# Patient Record
Sex: FEMALE | Race: WHITE
[De-identification: names, ages, dates, MRNs, and addresses within clinical notes are randomized per-mention and may not be internally consistent; named-entity substitution may affect disease eponyms.]

---

## 2018-07-31 ENCOUNTER — HOSPITAL ENCOUNTER (OUTPATIENT)
Dept: HOSPITAL 96 - M.LAB | Age: 68
End: 2018-07-31
Attending: ANESTHESIOLOGY
Payer: COMMERCIAL

## 2018-07-31 DIAGNOSIS — E03.9: ICD-10-CM

## 2018-07-31 DIAGNOSIS — Z01.812: Primary | ICD-10-CM

## 2019-06-10 ENCOUNTER — HOSPITAL ENCOUNTER (EMERGENCY)
Dept: HOSPITAL 96 - M.ERS | Age: 69
Discharge: HOME | End: 2019-06-10
Payer: COMMERCIAL

## 2019-06-10 VITALS — BODY MASS INDEX: 28.32 KG/M2 | HEIGHT: 65 IN | WEIGHT: 170 LBS

## 2019-06-10 VITALS — DIASTOLIC BLOOD PRESSURE: 84 MMHG | SYSTOLIC BLOOD PRESSURE: 142 MMHG

## 2019-06-10 DIAGNOSIS — H83.02: Primary | ICD-10-CM

## 2019-06-10 DIAGNOSIS — E03.9: ICD-10-CM

## 2019-06-10 LAB
ABSOLUTE BASOPHILS: 0 THOU/UL (ref 0–0.2)
ABSOLUTE EOSINOPHILS: 0.4 THOU/UL (ref 0–0.7)
ABSOLUTE MONOCYTES: 0.6 THOU/UL (ref 0–1.2)
ALBUMIN SERPL-MCNC: 3.9 G/DL (ref 3.4–5)
ALP SERPL-CCNC: 71 U/L (ref 46–116)
ALT SERPL-CCNC: 24 U/L (ref 30–65)
ANION GAP SERPL CALC-SCNC: 7 MMOL/L (ref 7–16)
APTT BLD: 25.6 SECONDS (ref 25–31.3)
AST SERPL-CCNC: 15 U/L (ref 15–37)
BASOPHILS NFR BLD AUTO: 0.7 %
BILIRUB SERPL-MCNC: 0.6 MG/DL
BUN SERPL-MCNC: 17 MG/DL (ref 7–18)
CALCIUM SERPL-MCNC: 9 MG/DL (ref 8.5–10.1)
CHLORIDE SERPL-SCNC: 102 MMOL/L (ref 98–107)
CO2 SERPL-SCNC: 30 MMOL/L (ref 21–32)
CREAT SERPL-MCNC: 1.1 MG/DL (ref 0.6–1.3)
EOSINOPHIL NFR BLD: 6.1 %
GLUCOSE SERPL-MCNC: 119 MG/DL (ref 70–99)
GRANULOCYTES NFR BLD MANUAL: 57.1 %
HCT VFR BLD CALC: 40.6 % (ref 37–47)
HGB BLD-MCNC: 13.4 GM/DL (ref 12–15)
INR PPP: 1
LYMPHOCYTES # BLD: 1.9 THOU/UL (ref 0.8–5.3)
LYMPHOCYTES NFR BLD AUTO: 27.4 %
MCH RBC QN AUTO: 31.5 PG (ref 26–34)
MCHC RBC AUTO-ENTMCNC: 33.1 G/DL (ref 28–37)
MCV RBC: 95.3 FL (ref 80–100)
MONOCYTES NFR BLD: 8.7 %
MPV: 10.3 FL. (ref 7.2–11.1)
NEUTROPHILS # BLD: 4 THOU/UL (ref 1.6–8.1)
NT-PRO BRAIN NAT PEPTIDE: 65 PG/ML (ref ?–300)
NUCLEATED RBCS: 0 /100WBC
PLATELET COUNT*: 241 THOU/UL (ref 150–400)
POTASSIUM SERPL-SCNC: 3.4 MMOL/L (ref 3.5–5.1)
PROT SERPL-MCNC: 8 G/DL (ref 6.4–8.2)
PROTHROMBIN TIME: 10.7 SECONDS (ref 9.2–11.5)
RBC # BLD AUTO: 4.26 MIL/UL (ref 4.2–5)
RDW-CV: 12.6 % (ref 10.5–14.5)
SODIUM SERPL-SCNC: 139 MMOL/L (ref 136–145)
TROPONIN-I LEVEL: <0.06 NG/ML (ref ?–0.06)
WBC # BLD AUTO: 7 THOU/UL (ref 4–11)

## 2019-06-10 NOTE — EKG
[place] [address]
Phone:  [phone]                     ELECTROCARDIOGRAM REPORT      
_______________________________________________________________________________
 
Name:       ART DOLL             Room:                      [facility]#:  [id number]      Account #:      [id number]  
Admission:  06/10/19     Attend Phys:                         
Discharge:  06/10/19     Date of Birth:  [birth year]  
         Report #: [id number]
    [id number]
_______________________________________________________________________________
THIS REPORT FOR:  //name//                      
 
                          [facility]
                                       
Test Date:    2019-06-10               Test Time:    09:35:50
Pat Name:     ART DOLL         Department:   
Patient ID:   [id number]            Room:          
Gender:       F                        Technician:   AL
:          1950               Requested By: Connor Norman
Order Number: [id number] MD:   Boone Cho
                                 Measurements
Intervals                              Axis          
Rate:         57                       P:            45
LA:           224                      QRS:          56
QRSD:         108                      T:            35
QT:           503                                    
QTc:          490                                    
                           Interpretive Statements
Sinus rhythm
Prolonged LA interval
Anteroseptal infarct, age indeterminate
Compared to ECG 2017 00:36:05
First degree AV block now present
Myocardial infarct finding now present
Atrial fibrillation no longer present
ST (T wave) deviation no longer present
 
Electronically Signed On 6- 15:44:35 CDT by Boone Cho
[email or web address]
 
 
 
 
 
 
 
 
 
 
 
 
 
 
  <ELECTRONICALLY SIGNED>
                                           By: Boone Cho MD, [facility]     
  06/10/19     1544
D: 06/10/19 0935   _____________________________________
T: 06/10/19 0935   Boone Cho MD, [facility]       /EPI

## 2021-07-14 ENCOUNTER — HOSPITAL ENCOUNTER (EMERGENCY)
Dept: HOSPITAL 96 - M.ERS | Age: 71
Discharge: HOME | End: 2021-07-14
Payer: COMMERCIAL

## 2021-07-14 VITALS — DIASTOLIC BLOOD PRESSURE: 74 MMHG | SYSTOLIC BLOOD PRESSURE: 124 MMHG

## 2021-07-14 VITALS — HEIGHT: 65 IN | WEIGHT: 170 LBS | BODY MASS INDEX: 28.32 KG/M2
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
## 2021-07-14 DIAGNOSIS — E03.9: ICD-10-CM

## 2021-07-14 DIAGNOSIS — I48.92: ICD-10-CM

## 2021-07-14 DIAGNOSIS — S52.612A: Primary | ICD-10-CM
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
## 2021-07-14 DIAGNOSIS — M25.551: ICD-10-CM
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
## 2021-07-14 DIAGNOSIS — S52.572A: ICD-10-CM

## 2021-07-14 DIAGNOSIS — Z79.899: ICD-10-CM

## 2021-07-27 ENCOUNTER — HOSPITAL ENCOUNTER (OUTPATIENT)
Dept: HOSPITAL 96 - M.LAB | Age: 71
End: 2021-07-27
Attending: ORTHOPAEDIC SURGERY
Payer: COMMERCIAL

## 2021-07-27 DIAGNOSIS — Z01.812: Primary | ICD-10-CM

## 2021-07-27 DIAGNOSIS — Z20.822: ICD-10-CM
